# Patient Record
Sex: FEMALE | Race: OTHER | Employment: FULL TIME | URBAN - METROPOLITAN AREA
[De-identification: names, ages, dates, MRNs, and addresses within clinical notes are randomized per-mention and may not be internally consistent; named-entity substitution may affect disease eponyms.]

---

## 2021-04-12 ENCOUNTER — HOSPITAL ENCOUNTER (EMERGENCY)
Facility: HOSPITAL | Age: 56
Discharge: HOME | End: 2021-04-12
Attending: EMERGENCY MEDICINE
Payer: COMMERCIAL

## 2021-04-12 VITALS
RESPIRATION RATE: 15 BRPM | HEIGHT: 63 IN | OXYGEN SATURATION: 99 % | DIASTOLIC BLOOD PRESSURE: 80 MMHG | WEIGHT: 150 LBS | TEMPERATURE: 98 F | HEART RATE: 68 BPM | BODY MASS INDEX: 26.58 KG/M2 | SYSTOLIC BLOOD PRESSURE: 148 MMHG

## 2021-04-12 DIAGNOSIS — R10.13 ABDOMINAL PAIN, EPIGASTRIC: Primary | ICD-10-CM

## 2021-04-12 PROCEDURE — 81003 URINALYSIS AUTO W/O SCOPE: CPT

## 2021-04-12 PROCEDURE — 36415 COLL VENOUS BLD VENIPUNCTURE: CPT

## 2021-04-12 PROCEDURE — 85025 COMPLETE CBC W/AUTO DIFF WBC: CPT | Performed by: EMERGENCY MEDICINE

## 2021-04-12 PROCEDURE — 85025 COMPLETE CBC W/AUTO DIFF WBC: CPT

## 2021-04-12 PROCEDURE — 80076 HEPATIC FUNCTION PANEL: CPT | Performed by: EMERGENCY MEDICINE

## 2021-04-12 PROCEDURE — 80048 BASIC METABOLIC PNL TOTAL CA: CPT | Performed by: EMERGENCY MEDICINE

## 2021-04-12 PROCEDURE — 99283 EMERGENCY DEPT VISIT LOW MDM: CPT

## 2021-04-12 PROCEDURE — 80048 BASIC METABOLIC PNL TOTAL CA: CPT

## 2021-04-12 PROCEDURE — 83690 ASSAY OF LIPASE: CPT | Performed by: EMERGENCY MEDICINE

## 2021-04-12 PROCEDURE — 81003 URINALYSIS AUTO W/O SCOPE: CPT | Performed by: EMERGENCY MEDICINE

## 2021-04-12 RX ORDER — MAGNESIUM HYDROXIDE/ALUMINUM HYDROXICE/SIMETHICONE 120; 1200; 1200 MG/30ML; MG/30ML; MG/30ML
30 SUSPENSION ORAL ONCE
Status: COMPLETED | OUTPATIENT
Start: 2021-04-12 | End: 2021-04-12

## 2021-04-12 RX ORDER — PANTOPRAZOLE SODIUM 40 MG/1
40 TABLET, DELAYED RELEASE ORAL DAILY
Qty: 30 TABLET | Refills: 0 | Status: SHIPPED | OUTPATIENT
Start: 2021-04-12 | End: 2021-05-12

## 2021-04-12 RX ORDER — SUCRALFATE 1 G/1
1 TABLET ORAL
Qty: 40 TABLET | Refills: 0 | Status: SHIPPED | OUTPATIENT
Start: 2021-04-12 | End: 2021-04-22

## 2021-04-12 NOTE — ED INITIAL ASSESSMENT (HPI)
Pt came in for abdominal pain that started yesterday , + Nausea. Pt denies vomiting and diarrhea. Pt is A/Ox 4 breathing is unlabored. Pt is [place]ic speaking, Son is helping translate.

## 2021-04-13 NOTE — ED PROVIDER NOTES
Patient Seen in: [facility] Emergency Department    History   Patient presents with:  Abdomen/Flank Pain      HPI    The patient presents to the ED complaining of epigastric abdominal pain starting yesterday with associated nausea.   She denies any Appearance: She is well-developed. HENT:      Head: Normocephalic and atraumatic. Eyes:      General:         Right eye: No discharge. Left eye: No discharge.       Conjunctiva/sclera: Conjunctivae normal.   Neck:      Trachea: No tracheal devia RAINBOW DRAW BLUE   RAINBOW DRAW LAVENDER   RAINBOW DRAW LIGHT GREEN   RAINBOW DRAW GOLD   CBC W/ DIFFERENTIAL         Imaging Results Available and Reviewed while in ED: No results found.   ED Medications Administered:   Medications   Alum & Mag Hydroxid List as of 4/12/2021 10:45 PM    START taking these medications    Pantoprazole Sodium 40 MG Oral Tab EC  Take 1 tablet (40 mg total) by mouth daily for 30 doses. , Normal, Disp-30 tablet, R-0    sucralfate 1 g Oral Tab  Take 1 tablet (1 g total) by mouth 4

## 2021-12-30 ENCOUNTER — HOSPITAL ENCOUNTER (EMERGENCY)
Facility: HOSPITAL | Age: 56
Discharge: LEFT BEFORE DISCHARGE | End: 2021-12-30
Payer: COMMERCIAL

## 2021-12-30 VITALS
SYSTOLIC BLOOD PRESSURE: 134 MMHG | TEMPERATURE: 99 F | OXYGEN SATURATION: 97 % | BODY MASS INDEX: 27.6 KG/M2 | RESPIRATION RATE: 18 BRPM | WEIGHT: 150 LBS | HEIGHT: 62 IN | HEART RATE: 74 BPM | DIASTOLIC BLOOD PRESSURE: 83 MMHG

## 2021-12-30 NOTE — ED INITIAL ASSESSMENT (HPI)
Patient here with L flank pain beginning starting about half hour prior to arrival. Patient denies any urinary symptoms.